# Patient Record
Sex: FEMALE | Race: WHITE | NOT HISPANIC OR LATINO | Employment: FULL TIME | URBAN - METROPOLITAN AREA
[De-identification: names, ages, dates, MRNs, and addresses within clinical notes are randomized per-mention and may not be internally consistent; named-entity substitution may affect disease eponyms.]

---

## 2022-08-10 PROBLEM — N92.0 MENORRHAGIA WITH REGULAR CYCLE: Status: ACTIVE | Noted: 2022-08-10

## 2022-08-10 PROBLEM — D64.9 ANEMIA: Status: ACTIVE | Noted: 2022-08-10

## 2022-09-02 PROBLEM — N83.201 CYST OF RIGHT OVARY: Status: ACTIVE | Noted: 2022-09-02

## 2022-09-02 PROBLEM — D25.1 INTRAMURAL LEIOMYOMA OF UTERUS: Status: ACTIVE | Noted: 2022-09-02

## 2022-11-16 ENCOUNTER — HOSPITAL ENCOUNTER (OUTPATIENT)
Dept: PREADMISSION TESTING | Facility: HOSPITAL | Age: 37
Discharge: HOME | End: 2022-11-16
Attending: OBSTETRICS & GYNECOLOGY
Payer: COMMERCIAL

## 2022-11-16 VITALS
DIASTOLIC BLOOD PRESSURE: 78 MMHG | SYSTOLIC BLOOD PRESSURE: 134 MMHG | HEART RATE: 90 BPM | OXYGEN SATURATION: 100 % | TEMPERATURE: 99 F | RESPIRATION RATE: 14 BRPM

## 2022-11-16 DIAGNOSIS — Z01.818 PREOP EXAMINATION: Primary | ICD-10-CM

## 2022-11-16 DIAGNOSIS — D64.9 ANEMIA, UNSPECIFIED TYPE: ICD-10-CM

## 2022-11-16 DIAGNOSIS — I10 HYPERTENSION, UNSPECIFIED TYPE: ICD-10-CM

## 2022-11-16 DIAGNOSIS — D25.1 INTRAMURAL LEIOMYOMA OF UTERUS: ICD-10-CM

## 2022-11-16 DIAGNOSIS — N92.0 MENORRHAGIA WITH REGULAR CYCLE: ICD-10-CM

## 2022-11-16 LAB
ALBUMIN SERPL BCP-MCNC: 4 G/DL (ref 3.5–5.2)
ALP SERPL-CCNC: 79 U/L (ref 55–135)
ALT SERPL W/O P-5'-P-CCNC: 11 U/L (ref 10–44)
ANION GAP SERPL CALC-SCNC: 10 MMOL/L (ref 8–16)
AST SERPL-CCNC: 16 U/L (ref 10–40)
BASOPHILS # BLD AUTO: 0.04 K/UL (ref 0–0.2)
BASOPHILS NFR BLD: 0.7 % (ref 0–1.9)
BILIRUB SERPL-MCNC: 0.3 MG/DL (ref 0.1–1)
BUN SERPL-MCNC: 15 MG/DL (ref 6–20)
CALCIUM SERPL-MCNC: 9.4 MG/DL (ref 8.7–10.5)
CHLORIDE SERPL-SCNC: 103 MMOL/L (ref 95–110)
CO2 SERPL-SCNC: 24 MMOL/L (ref 23–29)
CREAT SERPL-MCNC: 0.8 MG/DL (ref 0.5–1.4)
DIFFERENTIAL METHOD: ABNORMAL
EOSINOPHIL # BLD AUTO: 0.1 K/UL (ref 0–0.5)
EOSINOPHIL NFR BLD: 1.9 % (ref 0–8)
ERYTHROCYTE [DISTWIDTH] IN BLOOD BY AUTOMATED COUNT: 19.9 % (ref 11.5–14.5)
EST. GFR  (NO RACE VARIABLE): >60 ML/MIN/1.73 M^2
GLUCOSE SERPL-MCNC: 113 MG/DL (ref 70–110)
HCT VFR BLD AUTO: 30.8 % (ref 37–48.5)
HGB BLD-MCNC: 9.4 G/DL (ref 12–16)
IMM GRANULOCYTES # BLD AUTO: 0.01 K/UL (ref 0–0.04)
IMM GRANULOCYTES NFR BLD AUTO: 0.2 % (ref 0–0.5)
LYMPHOCYTES # BLD AUTO: 2 K/UL (ref 1–4.8)
LYMPHOCYTES NFR BLD: 34.5 % (ref 18–48)
MCH RBC QN AUTO: 21.2 PG (ref 27–31)
MCHC RBC AUTO-ENTMCNC: 30.5 G/DL (ref 32–36)
MCV RBC AUTO: 70 FL (ref 82–98)
MONOCYTES # BLD AUTO: 0.5 K/UL (ref 0.3–1)
MONOCYTES NFR BLD: 8 % (ref 4–15)
NEUTROPHILS # BLD AUTO: 3.2 K/UL (ref 1.8–7.7)
NEUTROPHILS NFR BLD: 54.7 % (ref 38–73)
NRBC BLD-RTO: 0 /100 WBC
PLATELET # BLD AUTO: 338 K/UL (ref 150–450)
PMV BLD AUTO: 9.8 FL (ref 9.2–12.9)
POTASSIUM SERPL-SCNC: 3.9 MMOL/L (ref 3.5–5.1)
PROT SERPL-MCNC: 7.3 G/DL (ref 6–8.4)
RBC # BLD AUTO: 4.43 M/UL (ref 4–5.4)
SODIUM SERPL-SCNC: 137 MMOL/L (ref 136–145)
WBC # BLD AUTO: 5.86 K/UL (ref 3.9–12.7)

## 2022-11-16 PROCEDURE — 80053 COMPREHEN METABOLIC PANEL: CPT | Performed by: NURSE PRACTITIONER

## 2022-11-16 PROCEDURE — 85025 COMPLETE CBC W/AUTO DIFF WBC: CPT | Performed by: NURSE PRACTITIONER

## 2022-11-16 NOTE — ASSESSMENT & PLAN NOTE
- Patient presents today at the request of Dr. Delgado who plans on performing aTranscervical radiofrequency ablation of Leiomyoma of uterus with US guidance  on 11/28.     Known risk factors for perioperative complications: None    Difficulty with intubation is not anticipated.    Cardiac Risk Estimation: Based on the Revised Cardiac Risk index, patient is a Class 1 risk with a 3.9% risk of a major cardiac event in a low risk procedure.    1.) Preoperative workup as follows: hemoglobin, hematocrit, electrolytes, creatinine, glucose, liver function studies.  2.) Change in medication regimen before surgery: discontinue ASA 6 days before surgery, discontinue NSAIDs 5 days before surgery.  3.) Prophylaxis for cardiac events with perioperative beta-blockers: not indicated.  4.) Invasive hemodynamic monitoring perioperatively: not indicated.  5.) Deep vein thrombosis prophylaxis postoperatively: intermittent pneumatic compression boots and regimen to be chosen by surgical team.  6.) Surveillance for postoperative MI with ECG immediately postoperatively and on postoperati ve days 1 and 2 AND troponin levels 24 hours postoperatively and on day 4 or hospital discharge (whichever comes first): not indicated.  7.) Current medications which may produce withdrawal symptoms if withheld perioperatively: None.  8.) Other measures: None.

## 2022-11-16 NOTE — DISCHARGE INSTRUCTIONS
To confirm, Your doctor has instructed you that surgery is scheduled for 11/28/2022.       Please report to [facility].      Pre admit office will call afternoon prior to surgery with final arrival time.    INSTRUCTIONS IMPORTANT!!!     Do not eat, drink, or smoke after 12 midnight-including water. OK to brush teeth, no gum, candy or mints!    ¨ Take only these medicines with a small swallow of water-morning of surgery.    Norvasc, prozac    Pre operative instructions:  Please review the Pre-Operative Instruction booklet that you were given.        Bathing Instructions--See page 6 in the Pre-operative booklet.      Prevention of surgical site infections:     -Keep incisions clean and dry.   -Do not soak/submerge incisions in water until completely healed.   -Do not apply lotions, powders, creams, or deodorants to site.   -Always make sure hands are cleaned with antibacterial soap/ alcohol-based  prior to touching the surgical site.  (This includes doctors, nurses, staff, and yourself.)    Signs and symptoms:   -Redness and pain around the area where you had surgery   -Drainage of cloudy fluid from your surgical wound   -Fever over 100.4       I have read or had read and explained to me, and understand the above information.  Additional comments or instructions:  Received a copy of Pre-operative instructions booklet, FAQ surgical site infection sheet, and packets of hibiclens (if indicated).

## 2022-11-16 NOTE — H&P
Preoperative History and Physical  [facility]                                                                   Chief Complaint: Preoperative evaluation     History of Present Illness:      Daiana Albert is a 37 y.o. female with a PMHx of HTN, DIEGO, Fibroids, and leiomyoma who presents to the office today for a preoperative consultation at the request of Dr. Delgado who plans on performing a Transcervical radiofrequency ablation of Leiomyoma of uterus with US guidance on .     Functional Status:      The patient is able to climb a flight of stairs. The patient is able to ambulate without difficulty. The patient's functional status is not affected by the surgical problem. The patient's functional status is not affected by shortness of breath, chest pain, dyspnea on exertion and fatigue.      MET score greater than 4    Past Medical History:      Past Medical History:   Diagnosis Date    ASCUS of cervix with negative high risk HPV         Bile pigments in urine 2018    Family history of pancreatic cancer 2020    seeing if mom will test    Fibroids 2018    small    Gestational diabetes mellitus     HTN (hypertension) 10/04/2017    Insulin resistance     Iron deficiency anemia 2022    History of menorrhagia    Menorrhagia with regular cycle 2022    EMB benign secretory        Past Surgical History:      Past Surgical History:   Procedure Laterality Date     SECTION, CLASSIC  2010    WISDOM TOOTH EXTRACTION          Social History:      Social History     Socioeconomic History    Marital status:    Tobacco Use    Smoking status: Never    Smokeless tobacco: Never   Substance and Sexual Activity    Alcohol use: Yes     Comment: Drinks beer nightly.    Drug use: Never    Sexual activity: Yes     Partners: Male     Birth control/protection: Partner-Vasectomy        Family History:      Family  History   Problem Relation Age of Onset    Hypertension Mother     Diabetes type II Mother     Hypertension Father     Lung cancer Maternal Grandmother 71    Diabetes type II Maternal Grandmother     Pancreatic cancer Maternal Grandfather     No Known Problems Paternal Grandfather     Stroke Paternal Grandmother        Allergies:      Review of patient's allergies indicates:  No Known Allergies    Medications:      Current Outpatient Medications   Medication Sig    amLODIPine (NORVASC) 5 MG tablet Take 5 mg by mouth once daily.    FLUoxetine 10 MG capsule Take 1 capsule by mouth once daily.     No current facility-administered medications for this encounter.       Vitals:      Vitals:    11/16/22 1311   BP: 134/78   Pulse: 90   Resp: 14   Temp: 98.6 °F (37 °C)       Review of Systems:        Constitutional: Negative for fever, chills, weight loss, malaise/fatigue and diaphoresis.   HENT: Negative for hearing loss, ear pain, nosebleeds, congestion, sore throat, neck pain, tinnitus and ear discharge.    Eyes: Negative for blurred vision, double vision, photophobia, pain, discharge and redness.   Respiratory: Negative for cough, hemoptysis, sputum production, shortness of breath, wheezing and stridor.    Cardiovascular: Negative for chest pain, palpitations, orthopnea, claudication, leg swelling and PND.   Gastrointestinal: Negative for heartburn, nausea, vomiting, abdominal pain, diarrhea, constipation, blood in stool and melena.   Genitourinary: Negative for dysuria, urgency, frequency, hematuria and flank pain. Positive for menorrhagia associated with pelvic pain.  Musculoskeletal: Negative for myalgias, back pain, joint pain and falls.   Skin: Negative for itching and rash.   Neurological: Negative for dizziness, tingling, tremors, sensory change, speech change, focal weakness, seizures, loss of consciousness, weakness and headaches.   Endo/Heme/Allergies: Negative for environmental allergies and polydipsia. Does  not bruise/bleed easily.   Psychiatric/Behavioral: Negative for depression, suicidal ideas, hallucinations, memory loss and substance abuse. The patient is not nervous/anxious and does not have insomnia.    All 14 systems reviewed and negative except as noted above.    Physical Exam:      Constitutional: Appears well-developed, well-nourished and in no acute distress.  Patient is oriented to person, place, and time.   Head: Normocephalic and atraumatic. Mucous membranes moist.  Neck: Neck supple no mass.   Cardiovascular: Normal rate and regular rhythm.  S1 S2 appreciated by ascultation.  Pulmonary/Chest: Effort normal and clear to auscultation bilaterally. No respiratory distress.   Abdomen: Soft. Non-tender and non-distended. Bowel sounds are normal.   Neurological: Patient is alert and oriented to person, place and time. Moves all extremities.  Skin: Warm and dry. No lesions.  Extremities: No clubbing, cyanosis or edema.    Laboratory data:      Reviewed and noted in plan where applicable. Please see chart for full laboratory data.    No results for input(s): CPK, CPKMB, TROPONINI, MB in the last 24 hours. No results for input(s): POCTGLUCOSE in the last 24 hours.     No results found for: INR, PROTIME    Lab Results   Component Value Date    WBC 5.86 11/16/2022    HGB 9.4 (L) 11/16/2022    HCT 30.8 (L) 11/16/2022    MCV 70 (L) 11/16/2022     11/16/2022       No results for input(s): GLU, NA, K, CL, CO2, BUN, CREATININE, CALCIUM, MG in the last 24 hours.    Predictors of intubation difficulty:       Morbid obesity? no   Anatomically abnormal facies? no   Prominent incisors? no   Receding mandible? no   Short, thick neck? no   Neck range of motion: normal   Dentition: No chipped, loose, or missing teeth.  Based on the Modified Mallampati, patient is a mallampati score: I (soft palate, uvula, fauces, and tonsillar pillars visible)    Cardiographics:      ECG:  Not indicated.    Echocardiogram:  Not  indicated.    Imaging:      Chest x-ray:  Not indicated.      Assessment and Plan:      Intramural leiomyoma of uterus  - Patient presents today at the request of Dr. Delgado who plans on performing aTranscervical radiofrequency ablation of Leiomyoma of uterus with US guidance  on 11/28.     Known risk factors for perioperative complications: None    Difficulty with intubation is not anticipated.    Cardiac Risk Estimation: Based on the Revised Cardiac Risk index, patient is a Class 1 risk with a 3.9% risk of a major cardiac event in a low risk procedure.    1.) Preoperative workup as follows: hemoglobin, hematocrit, electrolytes, creatinine, glucose, liver function studies.  2.) Change in medication regimen before surgery: discontinue ASA 6 days before surgery, discontinue NSAIDs 5 days before surgery.  3.) Prophylaxis for cardiac events with perioperative beta-blockers: not indicated.  4.) Invasive hemodynamic monitoring perioperatively: not indicated.  5.) Deep vein thrombosis prophylaxis postoperatively: intermittent pneumatic compression boots and regimen to be chosen by surgical team.  6.) Surveillance for postoperative MI with ECG immediately postoperatively and on postoperati ve days 1 and 2 AND troponin levels 24 hours postoperatively and on day 4 or hospital discharge (whichever comes first): not indicated.  7.) Current medications which may produce withdrawal symptoms if withheld perioperatively: None.  8.) Other measures: None.    Menorrhagia with regular cycle  - See plan as per above.    Anemia  - Hgb in September 9.0.  - Patient endorses heavy menstrual cycles.  - CBC pending.    HTN (hypertension)  - BP well controlled.  - Continue Norvasc.        Electronically signed by Aurelia Ortiz DNP, ACNP on 11/16/2022 at 1:15 PM.

## 2022-11-16 NOTE — H&P (VIEW-ONLY)
Preoperative History and Physical  [facility]                                                                   Chief Complaint: Preoperative evaluation     History of Present Illness:      Daiana Albert is a 37 y.o. female with a PMHx of HTN, DIEGO, Fibroids, and leiomyoma who presents to the office today for a preoperative consultation at the request of Dr. Delgado who plans on performing a Transcervical radiofrequency ablation of Leiomyoma of uterus with US guidance on .     Functional Status:      The patient is able to climb a flight of stairs. The patient is able to ambulate without difficulty. The patient's functional status is not affected by the surgical problem. The patient's functional status is not affected by shortness of breath, chest pain, dyspnea on exertion and fatigue.      MET score greater than 4    Past Medical History:      Past Medical History:   Diagnosis Date    ASCUS of cervix with negative high risk HPV         Bile pigments in urine 2018    Family history of pancreatic cancer 2020    seeing if mom will test    Fibroids 2018    small    Gestational diabetes mellitus     HTN (hypertension) 10/04/2017    Insulin resistance     Iron deficiency anemia 2022    History of menorrhagia    Menorrhagia with regular cycle 2022    EMB benign secretory        Past Surgical History:      Past Surgical History:   Procedure Laterality Date     SECTION, CLASSIC  2010    WISDOM TOOTH EXTRACTION          Social History:      Social History     Socioeconomic History    Marital status:    Tobacco Use    Smoking status: Never    Smokeless tobacco: Never   Substance and Sexual Activity    Alcohol use: Yes     Comment: Drinks beer nightly.    Drug use: Never    Sexual activity: Yes     Partners: Male     Birth control/protection: Partner-Vasectomy        Family History:      Family  History   Problem Relation Age of Onset    Hypertension Mother     Diabetes type II Mother     Hypertension Father     Lung cancer Maternal Grandmother 71    Diabetes type II Maternal Grandmother     Pancreatic cancer Maternal Grandfather     No Known Problems Paternal Grandfather     Stroke Paternal Grandmother        Allergies:      Review of patient's allergies indicates:  No Known Allergies    Medications:      Current Outpatient Medications   Medication Sig    amLODIPine (NORVASC) 5 MG tablet Take 5 mg by mouth once daily.    FLUoxetine 10 MG capsule Take 1 capsule by mouth once daily.     No current facility-administered medications for this encounter.       Vitals:      Vitals:    11/16/22 1311   BP: 134/78   Pulse: 90   Resp: 14   Temp: 98.6 °F (37 °C)       Review of Systems:        Constitutional: Negative for fever, chills, weight loss, malaise/fatigue and diaphoresis.   HENT: Negative for hearing loss, ear pain, nosebleeds, congestion, sore throat, neck pain, tinnitus and ear discharge.    Eyes: Negative for blurred vision, double vision, photophobia, pain, discharge and redness.   Respiratory: Negative for cough, hemoptysis, sputum production, shortness of breath, wheezing and stridor.    Cardiovascular: Negative for chest pain, palpitations, orthopnea, claudication, leg swelling and PND.   Gastrointestinal: Negative for heartburn, nausea, vomiting, abdominal pain, diarrhea, constipation, blood in stool and melena.   Genitourinary: Negative for dysuria, urgency, frequency, hematuria and flank pain. Positive for menorrhagia associated with pelvic pain.  Musculoskeletal: Negative for myalgias, back pain, joint pain and falls.   Skin: Negative for itching and rash.   Neurological: Negative for dizziness, tingling, tremors, sensory change, speech change, focal weakness, seizures, loss of consciousness, weakness and headaches.   Endo/Heme/Allergies: Negative for environmental allergies and polydipsia. Does  not bruise/bleed easily.   Psychiatric/Behavioral: Negative for depression, suicidal ideas, hallucinations, memory loss and substance abuse. The patient is not nervous/anxious and does not have insomnia.    All 14 systems reviewed and negative except as noted above.    Physical Exam:      Constitutional: Appears well-developed, well-nourished and in no acute distress.  Patient is oriented to person, place, and time.   Head: Normocephalic and atraumatic. Mucous membranes moist.  Neck: Neck supple no mass.   Cardiovascular: Normal rate and regular rhythm.  S1 S2 appreciated by ascultation.  Pulmonary/Chest: Effort normal and clear to auscultation bilaterally. No respiratory distress.   Abdomen: Soft. Non-tender and non-distended. Bowel sounds are normal.   Neurological: Patient is alert and oriented to person, place and time. Moves all extremities.  Skin: Warm and dry. No lesions.  Extremities: No clubbing, cyanosis or edema.    Laboratory data:      Reviewed and noted in plan where applicable. Please see chart for full laboratory data.    No results for input(s): CPK, CPKMB, TROPONINI, MB in the last 24 hours. No results for input(s): POCTGLUCOSE in the last 24 hours.     No results found for: INR, PROTIME    Lab Results   Component Value Date    WBC 5.86 11/16/2022    HGB 9.4 (L) 11/16/2022    HCT 30.8 (L) 11/16/2022    MCV 70 (L) 11/16/2022     11/16/2022       No results for input(s): GLU, NA, K, CL, CO2, BUN, CREATININE, CALCIUM, MG in the last 24 hours.    Predictors of intubation difficulty:       Morbid obesity? no   Anatomically abnormal facies? no   Prominent incisors? no   Receding mandible? no   Short, thick neck? no   Neck range of motion: normal   Dentition: No chipped, loose, or missing teeth.  Based on the Modified Mallampati, patient is a mallampati score: I (soft palate, uvula, fauces, and tonsillar pillars visible)    Cardiographics:      ECG:  Not indicated.    Echocardiogram:  Not  indicated.    Imaging:      Chest x-ray:  Not indicated.      Assessment and Plan:      Intramural leiomyoma of uterus  - Patient presents today at the request of Dr. Delgado who plans on performing aTranscervical radiofrequency ablation of Leiomyoma of uterus with US guidance  on 11/28.     Known risk factors for perioperative complications: None    Difficulty with intubation is not anticipated.    Cardiac Risk Estimation: Based on the Revised Cardiac Risk index, patient is a Class 1 risk with a 3.9% risk of a major cardiac event in a low risk procedure.    1.) Preoperative workup as follows: hemoglobin, hematocrit, electrolytes, creatinine, glucose, liver function studies.  2.) Change in medication regimen before surgery: discontinue ASA 6 days before surgery, discontinue NSAIDs 5 days before surgery.  3.) Prophylaxis for cardiac events with perioperative beta-blockers: not indicated.  4.) Invasive hemodynamic monitoring perioperatively: not indicated.  5.) Deep vein thrombosis prophylaxis postoperatively: intermittent pneumatic compression boots and regimen to be chosen by surgical team.  6.) Surveillance for postoperative MI with ECG immediately postoperatively and on postoperati ve days 1 and 2 AND troponin levels 24 hours postoperatively and on day 4 or hospital discharge (whichever comes first): not indicated.  7.) Current medications which may produce withdrawal symptoms if withheld perioperatively: None.  8.) Other measures: None.    Menorrhagia with regular cycle  - See plan as per above.    Anemia  - Hgb in September 9.0.  - Patient endorses heavy menstrual cycles.  - CBC pending.    HTN (hypertension)  - BP well controlled.  - Continue Norvasc.        Electronically signed by Aurelia Ortiz DNP, ACNP on 11/16/2022 at 1:15 PM.

## 2022-11-22 ENCOUNTER — ANESTHESIA EVENT (OUTPATIENT)
Dept: SURGERY | Facility: HOSPITAL | Age: 37
End: 2022-11-22
Payer: COMMERCIAL

## 2022-11-28 ENCOUNTER — ANESTHESIA (OUTPATIENT)
Dept: SURGERY | Facility: HOSPITAL | Age: 37
End: 2022-11-28
Payer: COMMERCIAL

## 2022-11-28 ENCOUNTER — HOSPITAL ENCOUNTER (OUTPATIENT)
Facility: HOSPITAL | Age: 37
Discharge: HOME | End: 2022-11-28
Attending: OBSTETRICS & GYNECOLOGY | Admitting: OBSTETRICS & GYNECOLOGY
Payer: COMMERCIAL

## 2022-11-28 VITALS
HEIGHT: 64 IN | RESPIRATION RATE: 20 BRPM | TEMPERATURE: 97 F | HEART RATE: 88 BPM | SYSTOLIC BLOOD PRESSURE: 128 MMHG | WEIGHT: 140.88 LBS | BODY MASS INDEX: 24.05 KG/M2 | OXYGEN SATURATION: 100 % | DIASTOLIC BLOOD PRESSURE: 78 MMHG

## 2022-11-28 DIAGNOSIS — N92.0 MENORRHAGIA WITH REGULAR CYCLE: Primary | ICD-10-CM

## 2022-11-28 LAB
B-HCG UR QL: NEGATIVE
CTP QC/QA: YES

## 2022-11-28 PROCEDURE — D9220A PRA ANESTHESIA: ICD-10-PCS | Mod: ANES,,, | Performed by: ANESTHESIOLOGY

## 2022-11-28 PROCEDURE — 71000015 HC POSTOP RECOV 1ST HR: Performed by: OBSTETRICS & GYNECOLOGY

## 2022-11-28 PROCEDURE — 88305 TISSUE EXAM BY PATHOLOGIST: CPT | Mod: 26,,, | Performed by: PATHOLOGY

## 2022-11-28 PROCEDURE — 25000003 PHARM REV CODE 250: Performed by: ANESTHESIOLOGY

## 2022-11-28 PROCEDURE — D9220A PRA ANESTHESIA: ICD-10-PCS | Mod: CRNA,,, | Performed by: NURSE ANESTHETIST, CERTIFIED REGISTERED

## 2022-11-28 PROCEDURE — D9220A PRA ANESTHESIA: Mod: CRNA,,, | Performed by: NURSE ANESTHETIST, CERTIFIED REGISTERED

## 2022-11-28 PROCEDURE — 36000707: Performed by: OBSTETRICS & GYNECOLOGY

## 2022-11-28 PROCEDURE — 63600175 PHARM REV CODE 636 W HCPCS: Performed by: ANESTHESIOLOGY

## 2022-11-28 PROCEDURE — 63600175 PHARM REV CODE 636 W HCPCS: Performed by: NURSE ANESTHETIST, CERTIFIED REGISTERED

## 2022-11-28 PROCEDURE — 37000008 HC ANESTHESIA 1ST 15 MINUTES: Performed by: OBSTETRICS & GYNECOLOGY

## 2022-11-28 PROCEDURE — D9220A PRA ANESTHESIA: Mod: ANES,,, | Performed by: ANESTHESIOLOGY

## 2022-11-28 PROCEDURE — 25000003 PHARM REV CODE 250: Performed by: NURSE ANESTHETIST, CERTIFIED REGISTERED

## 2022-11-28 PROCEDURE — 88305 TISSUE EXAM BY PATHOLOGIST: ICD-10-PCS | Mod: 26,,, | Performed by: PATHOLOGY

## 2022-11-28 PROCEDURE — 37000009 HC ANESTHESIA EA ADD 15 MINS: Performed by: OBSTETRICS & GYNECOLOGY

## 2022-11-28 PROCEDURE — 81025 URINE PREGNANCY TEST: CPT | Performed by: ANESTHESIOLOGY

## 2022-11-28 PROCEDURE — 36000706: Performed by: OBSTETRICS & GYNECOLOGY

## 2022-11-28 PROCEDURE — 88305 TISSUE EXAM BY PATHOLOGIST: CPT | Performed by: PATHOLOGY

## 2022-11-28 PROCEDURE — 71000033 HC RECOVERY, INTIAL HOUR: Performed by: OBSTETRICS & GYNECOLOGY

## 2022-11-28 PROCEDURE — 27201423 OPTIME MED/SURG SUP & DEVICES STERILE SUPPLY: Performed by: OBSTETRICS & GYNECOLOGY

## 2022-11-28 RX ORDER — PROPOFOL 10 MG/ML
INJECTION, EMULSION INTRAVENOUS
Status: DISCONTINUED | OUTPATIENT
Start: 2022-11-28 | End: 2022-11-28

## 2022-11-28 RX ORDER — EPHEDRINE SULFATE 50 MG/ML
INJECTION, SOLUTION INTRAVENOUS
Status: DISCONTINUED | OUTPATIENT
Start: 2022-11-28 | End: 2022-11-28

## 2022-11-28 RX ORDER — SODIUM CHLORIDE, SODIUM LACTATE, POTASSIUM CHLORIDE, CALCIUM CHLORIDE 600; 310; 30; 20 MG/100ML; MG/100ML; MG/100ML; MG/100ML
INJECTION, SOLUTION INTRAVENOUS CONTINUOUS
Status: ACTIVE | OUTPATIENT
Start: 2022-11-28

## 2022-11-28 RX ORDER — SILVER NITRATE 38.21; 12.74 MG/1; MG/1
STICK TOPICAL
Status: DISCONTINUED
Start: 2022-11-28 | End: 2022-11-28 | Stop reason: HOSPADM

## 2022-11-28 RX ORDER — ONDANSETRON 2 MG/ML
4 INJECTION INTRAMUSCULAR; INTRAVENOUS ONCE AS NEEDED
Status: COMPLETED | OUTPATIENT
Start: 2022-11-28 | End: 2022-11-28

## 2022-11-28 RX ORDER — DIPHENHYDRAMINE HYDROCHLORIDE 50 MG/ML
25 INJECTION INTRAMUSCULAR; INTRAVENOUS EVERY 4 HOURS PRN
Status: DISCONTINUED | OUTPATIENT
Start: 2022-11-28 | End: 2022-11-28 | Stop reason: HOSPADM

## 2022-11-28 RX ORDER — KETOROLAC TROMETHAMINE 30 MG/ML
INJECTION, SOLUTION INTRAMUSCULAR; INTRAVENOUS
Status: DISCONTINUED | OUTPATIENT
Start: 2022-11-28 | End: 2022-11-28

## 2022-11-28 RX ORDER — DEXAMETHASONE SODIUM PHOSPHATE 4 MG/ML
INJECTION, SOLUTION INTRA-ARTICULAR; INTRALESIONAL; INTRAMUSCULAR; INTRAVENOUS; SOFT TISSUE
Status: DISCONTINUED | OUTPATIENT
Start: 2022-11-28 | End: 2022-11-28

## 2022-11-28 RX ORDER — DIPHENHYDRAMINE HCL 25 MG
25 CAPSULE ORAL EVERY 4 HOURS PRN
Status: DISCONTINUED | OUTPATIENT
Start: 2022-11-28 | End: 2022-11-28 | Stop reason: HOSPADM

## 2022-11-28 RX ORDER — ACETAMINOPHEN 10 MG/ML
INJECTION, SOLUTION INTRAVENOUS
Status: DISCONTINUED | OUTPATIENT
Start: 2022-11-28 | End: 2022-11-28

## 2022-11-28 RX ORDER — FENTANYL CITRATE 50 UG/ML
INJECTION, SOLUTION INTRAMUSCULAR; INTRAVENOUS
Status: DISCONTINUED | OUTPATIENT
Start: 2022-11-28 | End: 2022-11-28

## 2022-11-28 RX ORDER — FENTANYL CITRATE 50 UG/ML
25 INJECTION, SOLUTION INTRAMUSCULAR; INTRAVENOUS EVERY 5 MIN PRN
Status: DISCONTINUED | OUTPATIENT
Start: 2022-11-28 | End: 2022-11-28 | Stop reason: HOSPADM

## 2022-11-28 RX ORDER — HYDROCODONE BITARTRATE AND ACETAMINOPHEN 5; 325 MG/1; MG/1
1 TABLET ORAL
Status: DISCONTINUED | OUTPATIENT
Start: 2022-11-28 | End: 2022-11-28 | Stop reason: HOSPADM

## 2022-11-28 RX ORDER — MIDAZOLAM HYDROCHLORIDE 1 MG/ML
INJECTION INTRAMUSCULAR; INTRAVENOUS
Status: DISCONTINUED | OUTPATIENT
Start: 2022-11-28 | End: 2022-11-28

## 2022-11-28 RX ORDER — LIDOCAINE HCL/PF 100 MG/5ML
SYRINGE (ML) INTRAVENOUS
Status: DISCONTINUED | OUTPATIENT
Start: 2022-11-28 | End: 2022-11-28

## 2022-11-28 RX ORDER — LIDOCAINE HYDROCHLORIDE AND EPINEPHRINE 10; 10 MG/ML; UG/ML
INJECTION, SOLUTION INFILTRATION; PERINEURAL
Status: DISCONTINUED | OUTPATIENT
Start: 2022-11-28 | End: 2022-11-28 | Stop reason: HOSPADM

## 2022-11-28 RX ORDER — PROCHLORPERAZINE EDISYLATE 5 MG/ML
5 INJECTION INTRAMUSCULAR; INTRAVENOUS EVERY 6 HOURS PRN
Status: DISCONTINUED | OUTPATIENT
Start: 2022-11-28 | End: 2022-11-28 | Stop reason: HOSPADM

## 2022-11-28 RX ORDER — ALBUTEROL SULFATE 0.83 MG/ML
2.5 SOLUTION RESPIRATORY (INHALATION) EVERY 4 HOURS PRN
Status: DISCONTINUED | OUTPATIENT
Start: 2022-11-28 | End: 2022-11-28 | Stop reason: HOSPADM

## 2022-11-28 RX ORDER — DEXMEDETOMIDINE HYDROCHLORIDE 100 UG/ML
INJECTION, SOLUTION INTRAVENOUS
Status: DISCONTINUED | OUTPATIENT
Start: 2022-11-28 | End: 2022-11-28

## 2022-11-28 RX ORDER — DIPHENHYDRAMINE HYDROCHLORIDE 50 MG/ML
25 INJECTION INTRAMUSCULAR; INTRAVENOUS EVERY 6 HOURS PRN
Status: DISCONTINUED | OUTPATIENT
Start: 2022-11-28 | End: 2022-11-28 | Stop reason: HOSPADM

## 2022-11-28 RX ORDER — MEPERIDINE HYDROCHLORIDE 25 MG/ML
12.5 INJECTION INTRAMUSCULAR; INTRAVENOUS; SUBCUTANEOUS ONCE
Status: DISCONTINUED | OUTPATIENT
Start: 2022-11-28 | End: 2022-11-28 | Stop reason: HOSPADM

## 2022-11-28 RX ORDER — ONDANSETRON 2 MG/ML
INJECTION INTRAMUSCULAR; INTRAVENOUS
Status: DISCONTINUED | OUTPATIENT
Start: 2022-11-28 | End: 2022-11-28

## 2022-11-28 RX ORDER — HYDROCODONE BITARTRATE AND ACETAMINOPHEN 5; 325 MG/1; MG/1
1 TABLET ORAL EVERY 4 HOURS PRN
Status: DISCONTINUED | OUTPATIENT
Start: 2022-11-28 | End: 2022-11-28 | Stop reason: HOSPADM

## 2022-11-28 RX ADMIN — ACETAMINOPHEN 1000 MG: 10 INJECTION, SOLUTION INTRAVENOUS at 11:11

## 2022-11-28 RX ADMIN — ONDANSETRON 4 MG: 2 INJECTION, SOLUTION INTRAMUSCULAR; INTRAVENOUS at 12:11

## 2022-11-28 RX ADMIN — Medication 40 MG: at 11:11

## 2022-11-28 RX ADMIN — SODIUM CHLORIDE, SODIUM LACTATE, POTASSIUM CHLORIDE, AND CALCIUM CHLORIDE: 600; 310; 30; 20 INJECTION, SOLUTION INTRAVENOUS at 10:11

## 2022-11-28 RX ADMIN — HYDROCODONE BITARTRATE AND ACETAMINOPHEN 1 TABLET: 5; 325 TABLET ORAL at 01:11

## 2022-11-28 RX ADMIN — SODIUM CHLORIDE, SODIUM LACTATE, POTASSIUM CHLORIDE, AND CALCIUM CHLORIDE: 600; 310; 30; 20 INJECTION, SOLUTION INTRAVENOUS at 12:11

## 2022-11-28 RX ADMIN — FENTANYL CITRATE 25 MCG: 50 INJECTION, SOLUTION INTRAMUSCULAR; INTRAVENOUS at 11:11

## 2022-11-28 RX ADMIN — MIDAZOLAM HYDROCHLORIDE 2 MG: 1 INJECTION INTRAMUSCULAR; INTRAVENOUS at 11:11

## 2022-11-28 RX ADMIN — PROPOFOL 180 MG: 10 INJECTION, EMULSION INTRAVENOUS at 11:11

## 2022-11-28 RX ADMIN — KETOROLAC TROMETHAMINE 30 MG: 30 INJECTION, SOLUTION INTRAMUSCULAR at 12:11

## 2022-11-28 RX ADMIN — EPHEDRINE SULFATE 10 MG: 50 INJECTION INTRAVENOUS at 11:11

## 2022-11-28 RX ADMIN — DEXAMETHASONE SODIUM PHOSPHATE 4 MG: 4 INJECTION, SOLUTION INTRA-ARTICULAR; INTRALESIONAL; INTRAMUSCULAR; INTRAVENOUS; SOFT TISSUE at 11:11

## 2022-11-28 RX ADMIN — ONDANSETRON 4 MG: 2 INJECTION INTRAMUSCULAR; INTRAVENOUS at 01:11

## 2022-11-28 RX ADMIN — DEXMEDETOMIDINE HYDROCHLORIDE 4 MCG: 100 INJECTION, SOLUTION INTRAVENOUS at 12:11

## 2022-11-28 RX ADMIN — DEXMEDETOMIDINE HYDROCHLORIDE 4 MCG: 100 INJECTION, SOLUTION INTRAVENOUS at 11:11

## 2022-11-28 RX ADMIN — FENTANYL CITRATE 50 MCG: 50 INJECTION, SOLUTION INTRAMUSCULAR; INTRAVENOUS at 12:11

## 2022-11-28 NOTE — DISCHARGE SUMMARY
[facility]  Discharge Note  Short Stay    Procedure(s) (LRB):  TRANSCERVICAL RADIOFREQUENCY ABLATION (RFA) OF LEIOMYOMA OF UTERUS WITH ULTRASOUND GUIDANCE (N/A)  ABLATION, ENDOMETRIUM, THERMAL, HYSTEROSCOPIC (N/A)  HYSTEROSCOPY, WITH DILATION AND CURETTAGE OF UTERUS (N/A)      OUTCOME: Patient tolerated treatment/procedure well without complication and is now ready for discharge.    DISPOSITION: Home or Self Care    FINAL DIAGNOSIS:  symptomatic fibroids and anemia, menorrhagia.    FOLLOWUP: In clinic    DISCHARGE INSTRUCTIONS:  procedures on file.     TIME SPENT ON DISCHARGE: 5 minutes

## 2022-11-28 NOTE — PATIENT INSTRUCTIONS
Call for heavy bleeding, fever, severe pain.  Pelvic rest until visit in the office post op.  FU about 2-4 weeks in the office.

## 2022-11-28 NOTE — OP NOTE
PREOPERATIVE DIAGNOSIS:   1. Symptomatic uterine fibroids  2.  Desires to retain her uterus.     Surgeon:Donna Delgado     Assistant: none    Anesthesia: General    Complications: None.    PROCEDURE:  SONATA and hysteroscopy D&C and em ablation with novasure    Date of Procedure: 2022      Daiana Albert is a  who presents with uterine fibroids and anemia. After the indications, risks, benefits, and alternatives were explained to the patient, her questions were answered and informed consent was obtained and signed.      PROCEDURE:  After adequate induction of anesthesia, the patient was identified and a time out was performed and procedure was confirmed. The patient was placed in dorsal lithotomy position and an exam under anesthesia revealed a mid-position, mobile fibroid uterus and normal adnexa. A speculum was placed, cervix was grasped with a tenaculum and sounded easily to a depth of 9 1/2 cm with cx length 3 cm (cavity 6.5 cm). The cervix was then easily dilated to a #27 Hernandez dilator. The operative hysteroscope was inserted, and the uterine cavity was visualized in its entirety. Both tubal ostia were noted to be normal.     The uterine cavity was notable for: thick em otherwise nl.  D&C was then completed.  The sonata handpiece was then inserted and a complete uterine survey was performed with the ultrasound. Sonata fibroid ablation was then performed:   1) 2 cm, type 4 , 1 oclock mid ant, 2 ablations 1. 2.1x1.4 cm for 1 min 18 sec  2. Ablation 2.0x1.3cm for 1 min 6 sec.  2) 4 cm, type 4 , 6 oclock mid post mid. 1 ablation 3.3x2.4 for 3 min 36 sec      All cycles were carried out under direct ultrasound intrauterine guidance and visualization with the ablation guide noted to be within the serosa at all times. The fibroids treated appeared ablated with US guidance with outgassing noted by US appearance.     Novasure device was then placed. Cavity width 4.4 cm. Device tested  then ablation completed. 157 W for 47 sec.    Hysteroscopy was performed which demonstrated complete ablation on the submucosal/endometrial surface where the fibroids were located.  Endometrium was ablated well.    Sponge needle instrument count was correct x 2. All instruments were removed from the vagina. Hemostasis was assured. The patient was then awakened and taken to the recovery room in stable condition, tolerating the procedure well.     ESTIMATED BLOOD LOSS: 10 cc.     SPECIMENS OBTAINED: emc

## 2022-11-28 NOTE — DISCHARGE INSTRUCTIONS
For any medical emergency after 5 pm on Monday - Friday or on a weekend, please call [phone].

## 2022-11-28 NOTE — TRANSFER OF CARE
"Anesthesia Transfer of Care Note    Patient: Daiana Albert    Procedure(s) Performed: Procedure(s) (LRB):  TRANSCERVICAL RADIOFREQUENCY ABLATION (RFA) OF LEIOMYOMA OF UTERUS WITH ULTRASOUND GUIDANCE (N/A)  ABLATION, ENDOMETRIUM, THERMAL, HYSTEROSCOPIC (N/A)  HYSTEROSCOPY, WITH DILATION AND CURETTAGE OF UTERUS (N/A)    Patient location: PACU    Anesthesia Type: general    Transport from OR: Transported from OR on room air with adequate spontaneous ventilation    Post pain: adequate analgesia    Post assessment: no apparent anesthetic complications and tolerated procedure well    Post vital signs: stable    Level of consciousness: awake    Nausea/Vomiting: no nausea/vomiting    Complications: none    Transfer of care protocol was followed      Last vitals:   Visit Vitals  /71 (BP Location: Left arm, Patient Position: Lying)   Pulse 107   Temp 36.2 °C (97.2 °F) (Temporal)   Resp 15   Ht 5' 4" (1.626 m)   Wt 63.9 kg (140 lb 14 oz)   LMP 11/01/2022 (Approximate)   SpO2 99%   Breastfeeding No   BMI 24.18 kg/m²     "

## 2022-11-29 NOTE — ANESTHESIA POSTPROCEDURE EVALUATION
Anesthesia Post Evaluation    Patient: Daiana Albert    Procedure(s) Performed: Procedure(s) (LRB):  TRANSCERVICAL RADIOFREQUENCY ABLATION (RFA) OF LEIOMYOMA OF UTERUS WITH ULTRASOUND GUIDANCE (N/A)  ABLATION, ENDOMETRIUM, THERMAL, HYSTEROSCOPIC (N/A)  HYSTEROSCOPY, WITH DILATION AND CURETTAGE OF UTERUS (N/A)    Final Anesthesia Type: general      Patient location during evaluation: PACU  Patient participation: Yes- Able to Participate  Level of consciousness: awake and alert and oriented  Post-procedure vital signs: reviewed and stable  Pain management: adequate  Airway patency: patent    PONV status at discharge: No PONV  Anesthetic complications: no      Cardiovascular status: blood pressure returned to baseline, stable and hemodynamically stable  Respiratory status: unassisted  Hydration status: euvolemic  Follow-up not needed.          Vitals Value Taken Time   /78 11/28/22 1330     11/29/22 1339   Pulse 92 11/28/22 1336   Resp 32 11/28/22 1335   SpO2 98 % 11/28/22 1336   Vitals shown include unvalidated device data.      Event Time   Out of Recovery 13:30:00         Pain/Marcelo Score: Pain Rating Prior to Med Admin: 4 (11/28/2022  1:32 PM)  Marcelo Score: 10 (11/28/2022  1:30 PM)

## 2022-12-12 LAB
FINAL PATHOLOGIC DIAGNOSIS: NORMAL
GROSS: NORMAL
Lab: NORMAL

## (undated) DEVICE — PACK BASIC SETUP SC BR

## (undated) DEVICE — MANIFOLD 4 PORT

## (undated) DEVICE — GOWN SMARTGOWN RAGLAN BLUE XL

## (undated) DEVICE — SOL WATER STRL IRR 1000ML

## (undated) DEVICE — GLOVE SURGICAL LATEX SZ 8

## (undated) DEVICE — CATH ALL PURPOSE URETHRAL 14FR

## (undated) DEVICE — SYR ONLY LUER LOCK 20CC

## (undated) DEVICE — Device

## (undated) DEVICE — PAD ABD 8X10 STERILE

## (undated) DEVICE — SUPPORT ULNA NERVE PROTECTOR

## (undated) DEVICE — SYR B-D DISP CONTROL 10CC100/C

## (undated) DEVICE — DEVICE ABLATION NOVASURE DISP

## (undated) DEVICE — GOWN SMARTGOWN LVL4 X-LONG XL

## (undated) DEVICE — UNDERGLOVES BIOGEL PI SIZE 8

## (undated) DEVICE — STRAP ARMBOARD DISP 1.5X32IN

## (undated) DEVICE — TRAY SKIN SCRUB WET PREMIUM

## (undated) DEVICE — PACK DRAPE PERI/GYN TIBURON

## (undated) DEVICE — COVER CAMERA OPERATING ROOM

## (undated) DEVICE — DRAPE UINDERBUT GRAD PCH

## (undated) DEVICE — HEADREST ROUND DISP FOAM 9IN

## (undated) DEVICE — COVER LIGHT HANDLE 80/CA

## (undated) DEVICE — COVER PROXIMA MAYO STAND